# Patient Record
Sex: MALE | Race: WHITE
[De-identification: names, ages, dates, MRNs, and addresses within clinical notes are randomized per-mention and may not be internally consistent; named-entity substitution may affect disease eponyms.]

---

## 2019-03-16 ENCOUNTER — HOSPITAL ENCOUNTER (EMERGENCY)
Dept: HOSPITAL 80 - FED | Age: 19
Discharge: HOME | End: 2019-03-16
Payer: COMMERCIAL

## 2019-03-16 VITALS — DIASTOLIC BLOOD PRESSURE: 69 MMHG | SYSTOLIC BLOOD PRESSURE: 109 MMHG

## 2019-03-16 DIAGNOSIS — F10.920: ICD-10-CM
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
## 2019-03-16 DIAGNOSIS — S01.81XA: Primary | ICD-10-CM

## 2019-03-16 PROCEDURE — 0HQ1XZZ REPAIR FACE SKIN, EXTERNAL APPROACH: ICD-10-PCS

## 2019-03-16 NOTE — EDPHY
General


Time Seen by Provider: 03/16/19 17:37


Narrative: 





CLINICAL IMPRESSION: 


Chin laceration 


_______________________


ASSESSMENT/PLAN:


18-year-old male presents to the emergency department after he tripped up a 

step today and struck his chin on the step.  He did not have loss of 

consciousness, reports no headache, dizziness, vertigo, jaw pain, dental 

fracture, intraoral laceration or malocclusion.  No reproducible pain along 

palpation of the mandible.  Chin laceration was repaired by myself as per chart 

notes.  Tetanus up-to-date.  No midline neck pain or upper extremity 

radiculopathy.  Wound care discussed, PCP follow-up recommended, warning signs 

return to ED outlined discharge.


_______________________


DIFFERENTIAL DIAGNOSIS:


 includes but not limited to laceration of tendon or vascular structure, 

underlying fracture, laceration with retained FB


_____________________________


ED PROCEDURES:


Laceration Repair 


Verbal consent obtained by patient. Risks discussed, including but not limited 

to infection, pain, retained foreign body, need for additional repair, poor 

cosmetic result, tendon damage, nerve damage, poor wound healing, vascular 

damage. Alternatives to repair discussed. 


Universal protocol used to establish correct patient, procedure, equipment, 

support staff, and site. Anesthesia obtained by local infiltration. 

Anesthetized with 2% lidocaine with epinephrine. Laceration location chin, 

length 1.5 cm, depth 3 mm, Repair type intermediate. 


Patient was prepped and draped in usual sterile fashion. Hemostasis achieved 

with direct pressure. Wound explored through full range of motion and entire 

depth of wound probed and visualized with gloved finger. No suspicion for nerve 

damage, tendon damage, underlying fracture, vascular damage, foreign body, or 

contamination. Area was cleansed with Shur-Clens and irrigated with sterile 

saline as per protocol. No foreign body or material removed. 


Repair method 4 0 Vicryl subcutaneous simple interrupted suture, #1.  Six 0 

Prolene superficial, simple interrupted suture, #4.  Five sutures placed. Well 

aligned, closely approximated. wound was dressed with bacitracin and bandage. 

Patient tolerated well with no immediate complications.





Wound care:  Clean and dry x 24 hours, gently clean with soap and water, cover 

with topical antibiotic ointment/bandage.





Suture/Staple removal:  5-6  Days 


_____________________________


CHIEF COMPLAINT:  Laceration 


_______________________


HPI:


18-year-old intoxicated male presents to the emergency department after he was 

attempting to go up some steps, tripped and fell into the step sustaining a  

chin laceration.  He denies loss of consciousness.  He did not break any teeth, 

sustain any intraoral lacerations and has no complaints of jaw or neck pain.  

He denies any other injuries.  Tetanus is up-to-date.  Patient states "I am 

only worried about my chin". 


_____________________________


PAST MEDICAL HISTORY: None reported 





Pertinent Past Surgical History:  None reported





Social History:  [employer] student, tetanus up-to-date


_____________________________


REVIEW OF SYSTEMS:


All other systems negative


Constitutional:  No fever, no chills


Musculoskeletal:  No deformity, no joint pain


Skin:   Laceration to chin


Neurological:  No sensory loss or weakness, 2 point discrimination intact.


_____________________________


PHYSICAL EXAM:


General Appearance:  Alert, oriented, appropriate for age, cooperative, NAD, 

well hydrated, non-toxic appearing, intoxicated, VSS, no hypoxia.


Neurological:  Alert and oriented x 3


HEENT.  No intraoral laceration.  No pain reproducible to the mandible or 

mandibular condyles.  No external auditory canal laceration.


Skin: 1 cm stellate laceration to the chin.  No intraoral laceration or tongue 

laceration 


Musculoskeletal:   No midline neck pain.  Full range of motion of neck and 

bilateral upper extremities.   strength 5/5.


_____________________________


MEDICAL DECISION MAKING:


Patient was seen independently. Secondary supervising physician at time of 

evaluation was  Dr. Fink.


Diagnosis:  Chin laceration. New, requires workup


Summary: See assessment and plan for summary of ED visit 








Patient Progress improved, stable for discharge. 





- History


Smoking Status: Never smoked





- Objective


Vital Signs: 


 Initial Vital Signs











Temperature (C)  37.0 C   03/16/19 17:00


 


Heart Rate  75   03/16/19 17:00


 


Respiratory Rate  18   03/16/19 17:00


 


Blood Pressure  115/74   03/16/19 17:00


 


O2 Sat (%)  97   03/16/19 17:00








 











O2 Delivery Mode               Room Air














Medications Given: 


 








Discontinued Medications





Tetracaine/Epinephrine/Lidocaine (Let Gel Topical)  1 ea TP EDNOW ONE


   Stop: 03/16/19 17:55


   Last Admin: 03/16/19 18:37 Dose:  1 ea








Departure





- Departure


Disposition: Home, Routine, Self-Care


Clinical Impression: 


 Chin laceration





Condition: Good


Instructions:  Laceration (ED)


Additional Instructions: 


DISCHARGE INSTRUCTIONS FROM YOUR DOCTOR 


Thank you for visiting our emergency department today. You were treated by a 

physician assistant today and your case was reviewed with our ED Attending 

physician.  Please keep in mind that discharge from the emergency department 

does not mean that there is nothing wrong - it simply means that we have not 

identified an emergency condition that requires further evaluation or treatment 

in the hospital. You should always plan to follow up with primary care for re-

evaluation of your condition in the next 2-3 days. If you have been referred to 

a specialist, please call as soon as possible (today or tomorrow) to schedule 

your follow up appointment at the appropriate time. 





[ PLEASE HAVE SUTURES/STAPLES REMOVED IN 5  DAYS. YOU CAN RETURN TO THE 

EMERGENCY DEPARTMENT OR YOUR PRIMARY CARE FOR SUTURE/STAPLE REMOVAL. AVOID 

SUBMERGING SUTURES/STAPLES UNDERWATER FOR PROLONGED PERIOD OF TIME UNTIL 

REMOVED. KEEP WOUND CLEAN AND DRY, COVER WITH ANTIBIOTIC OINTMENT AND BAND-AID. 

RETURN TO EMERGENCY DEPARTMENT FOR REDNESS, SWELLING, DISCHARGE, WARMTH TO THE 

SKIN, OR ANY OTHER CONCERNS FOR INFECTION.


]





People present with illnesses and injuries in different ways, and it is always 

possible that we have missed something. You may always return for re-evaluation 

if symptoms worsen or if they are not improving or if you develop new/different 

symptoms. 


Again, thank you for choosing our emergency department. We hope that you feel 

better.


Referrals: 


NONE *PRIMARY CARE P,. [Primary Care Provider] - As per Instructions


[facility] [Clinic] - As per Instructions